# Patient Record
Sex: MALE | Race: WHITE | Employment: UNEMPLOYED | URBAN - METROPOLITAN AREA
[De-identification: names, ages, dates, MRNs, and addresses within clinical notes are randomized per-mention and may not be internally consistent; named-entity substitution may affect disease eponyms.]

---

## 2019-06-06 ENCOUNTER — HOSPITAL ENCOUNTER (EMERGENCY)
Facility: CLINIC | Age: 64
Discharge: HOME | End: 2019-06-06
Attending: EMERGENCY MEDICINE | Admitting: EMERGENCY MEDICINE
Payer: COMMERCIAL

## 2019-06-06 VITALS
SYSTOLIC BLOOD PRESSURE: 140 MMHG | HEART RATE: 77 BPM | RESPIRATION RATE: 16 BRPM | TEMPERATURE: 96.5 F | DIASTOLIC BLOOD PRESSURE: 86 MMHG | OXYGEN SATURATION: 98 %

## 2019-06-06 DIAGNOSIS — F10.10 ALCOHOL ABUSE: ICD-10-CM

## 2019-06-06 LAB
ALCOHOL BREATH TEST: 0.18 (ref 0–0.01)
ALCOHOL BREATH TEST: 0.25 (ref 0–0.01)

## 2019-06-06 PROCEDURE — 99285 EMERGENCY DEPT VISIT HI MDM: CPT | Performed by: EMERGENCY MEDICINE

## 2019-06-06 PROCEDURE — 82075 ASSAY OF BREATH ETHANOL: CPT | Mod: 91 | Performed by: EMERGENCY MEDICINE

## 2019-06-06 PROCEDURE — 99284 EMERGENCY DEPT VISIT MOD MDM: CPT | Mod: Z6 | Performed by: EMERGENCY MEDICINE

## 2019-06-06 PROCEDURE — 82075 ASSAY OF BREATH ETHANOL: CPT | Performed by: EMERGENCY MEDICINE

## 2019-06-06 PROCEDURE — 25000132 ZZH RX MED GY IP 250 OP 250 PS 637: Performed by: EMERGENCY MEDICINE

## 2019-06-06 RX ORDER — TRAZODONE HYDROCHLORIDE 300 MG/1
TABLET ORAL
COMMUNITY
End: 2019-06-06

## 2019-06-06 RX ORDER — LANOLIN ALCOHOL/MO/W.PET/CERES
100 CREAM (GRAM) TOPICAL ONCE
Status: COMPLETED | OUTPATIENT
Start: 2019-06-06 | End: 2019-06-06

## 2019-06-06 RX ORDER — MAGNESIUM OXIDE 400 MG/1
800 TABLET ORAL ONCE
Status: COMPLETED | OUTPATIENT
Start: 2019-06-06 | End: 2019-06-06

## 2019-06-06 RX ORDER — FOLIC ACID 1 MG/1
1 TABLET ORAL ONCE
Status: COMPLETED | OUTPATIENT
Start: 2019-06-06 | End: 2019-06-06

## 2019-06-06 RX ORDER — TRAZODONE HYDROCHLORIDE 50 MG/1
50 TABLET, FILM COATED ORAL AT BEDTIME
Qty: 3 TABLET | Refills: 0 | Status: SHIPPED | OUTPATIENT
Start: 2019-06-06

## 2019-06-06 RX ORDER — MULTIVITAMIN,THERAPEUTIC
1 TABLET ORAL ONCE
Status: COMPLETED | OUTPATIENT
Start: 2019-06-06 | End: 2019-06-06

## 2019-06-06 RX ORDER — ATENOLOL 50 MG/1
100 TABLET ORAL DAILY
Qty: 3 TABLET | Refills: 0 | Status: SHIPPED | OUTPATIENT
Start: 2019-06-06 | End: 2019-06-09

## 2019-06-06 RX ADMIN — THERA TABS 1 TABLET: TAB at 19:09

## 2019-06-06 RX ADMIN — Medication 100 MG: at 19:08

## 2019-06-06 RX ADMIN — MAGNESIUM OXIDE TAB 400 MG (241.3 MG ELEMENTAL MG) 800 MG: 400 (241.3 MG) TAB at 19:08

## 2019-06-06 RX ADMIN — FOLIC ACID 1 MG: 1 TABLET ORAL at 19:08

## 2019-06-06 ASSESSMENT — ENCOUNTER SYMPTOMS
SHORTNESS OF BREATH: 0
VOMITING: 0

## 2019-06-06 NOTE — ED NOTES
Bed: ED12  Expected date:   Expected time:   Means of arrival:   Comments:  Gilmer Andreia, SARABJIT 1350, ETOH

## 2019-06-06 NOTE — ED PROVIDER NOTES
"  History     Chief Complaint   Patient presents with     Alcohol Intoxication     pt was found wandering the  [place] [employer] [place]. Per EMS, he refused to leave the stadium voluntarily     HPI  Alexey Gomez is a 63 year old male with a history of ***, who presents to the Emergency Department for evaluation of alcohol intoxication.       I have reviewed the Medications, Allergies, Past Medical and Surgical History, and Social History in the Epic system.    Review of Systems    Physical Exam   BP: 91/52  Pulse: 79  Temp: 95.9  F (35.5  C)  Resp: 15  SpO2: 95 %      Physical Exam    ED Course        Procedures        {EKG done?:642143::\" \"}    Critical Care time:  {none or minutes:734895::\"none\"}  {trauma activation or Fall?:979318::\" \"}  {Sepsis/Septic Shock/Stemi/Stroke:486644::\" \"}       Labs Ordered and Resulted from Time of ED Arrival Up to the Time of Departure from the ED - No data to display         Assessments & Plan (with Medical Decision Making)   ***    I have reviewed the nursing notes.    I have reviewed the findings, diagnosis, plan and need for follow up with the patient.       Medication List      There are no discharge medications for this visit.         Final diagnoses:   None       6/6/2019   [facility] [place], EMERGENCY DEPARTMENT  "

## 2019-06-06 NOTE — ED NOTES
Called [address], gave report. will arrange for 3 days of medications and for patient to be transported.

## 2019-06-06 NOTE — ED PROVIDER NOTES
"  History     Chief Complaint   Patient presents with     Alcohol Intoxication     pt was found [place] the  [place] [employer] [place]. Per EMS, he refused to leave the stadium voluntarily     HPI  Carlos Gomez is a 63 year old male who was [place] [employer] intoxicated and [place]. The patient was brought by EMS to our facility where the patient s breathalyzer with a poor effort was .24. 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 Patient [facility] [place] now [facility] up here in the ER. Patient is uncertain of his surroundings or how he got here. Patient denies any pain, any shortness of breath, any vomiting and was seen in a cubical in the ED by me.    I have reviewed the Medications, Allergies, Past Medical and Surgical History, and Social History in the [facility] system.    Past Medical History:   Diagnosis Date     Depressive disorder      Hypertension        History reviewed. No pertinent surgical history.    History reviewed. No pertinent family history.    Social History     Tobacco Use     Smoking status: Never Smoker   Substance Use Topics     Alcohol use: Yes     Comment: \"I'm alcoholic\"          Current Outpatient Medications   Medication     ATENOLOL PO     traZODone HCl 300 MG TABS     Sertraline HCl (ZOLOFT PO)        Allergies   Allergen Reactions     Haldol [Haloperidol]        Review of Systems   Respiratory: Negative for shortness of breath.    Gastrointestinal: Negative for vomiting.       Physical Exam   BP: 91/52  Pulse: 79  Temp: 95.9  F (35.5  C)  Resp: 15  SpO2: 95 %      Physical Exam   Constitutional:   Unkept and easily arousable   HENT:   Head: Atraumatic.   Eyes: Pupils are equal, round, and reactive to light. EOM are normal.   Neck: Neck " supple.   Airway patent   Cardiovascular: Regular rhythm.   Pulmonary/Chest:   Good aeration bilaterally   Abdominal: Soft.   Musculoskeletal: He exhibits no edema or deformity.   Neurological:   Moves all extremities with equal strength   Skin: Skin is warm.   Psychiatric:   Intoxicated   Nursing note and vitals reviewed.      ED Course        Procedures            Labs Ordered and Resulted from Time of ED Arrival Up to the Time of Departure from the ED   ALCOHOL BREATH TEST POCT - Abnormal; Notable for the following components:       Result Value    Alcohol Breath Test 0.247 (*)     All other components within normal limits            Assessments & Plan (with Medical Decision Making)     I have reviewed the nursing notes.    Medications   multivitamin, therapeutic (THERA-VIT) tablet 1 tablet (has no administration in time range)   folic acid (FOLVITE) tablet 1 mg (has no administration in time range)   vitamin B1 (THIAMINE) tablet 100 mg (has no administration in time range)   magnesium oxide (MAG-OX) tablet 800 mg (has no administration in time range)     Patient will be allowed to sleep it off a little bit in the ER and then undergo evaluation for possible detox or discharge.  Case will be signed out to my partners will follow up with the patient's evaluation.      Working diagnoses:   Acute alcohol intoxication with alcoholism with delirium (H)     Sonido Messina MD    6/6/2019   [facility], [place], EMERGENCY DEPARTMENT     Sonido Messina MD  06/06/19 9210

## 2019-06-06 NOTE — ED AVS SNAPSHOT
[facility], [place], Emergency Department  [address]  [facility] [address]  Phone:  [phone]  Fax:  [phone]                                    Alexey Gomez   MRN: [id number]    Department:  [facility], Emergency Department   Date of Visit:  6/6/2019           After Visit Summary Signature Page    I have received my discharge instructions, and my questions have been answered. I have discussed any challenges I see with this plan with the nurse or doctor.    ..........................................................................................................................................  Patient/Patient Representative Signature      ..........................................................................................................................................  Patient Representative Print Name and Relationship to Patient    ..................................................               ................................................  Date                                   Time    ..........................................................................................................................................  Reviewed by Signature/Title    ...................................................              ..............................................  Date                                               Time          22EPIC Rev 08/18

## 2019-06-07 NOTE — ED NOTES
Reviewed discharge instructions and plan to go to [address] [place] [address] for detox with patient. Patient cooperative and agreeable with plan. Patient joking with RNs and EMS. Sent via ambulance with 3 days supply of home medications. Refused to sign discharge form.

## 2019-06-07 NOTE — DISCHARGE INSTRUCTIONS
To [address] [place] [address] to sob  Suggest that you drink in your house and no out in public  If you want help after you sober call

## 2019-06-07 NOTE — ED PROVIDER NOTES
SIGNED OUT TO ME BY DR NAVA.  Pt is intoxicated and stable and ambulatory  No beds at [facility] for detox  Will send to [address] [place] [address]     Kevin Rivera MD  06/07/19 0101